# Patient Record
Sex: FEMALE | Race: WHITE | Employment: FULL TIME | URBAN - METROPOLITAN AREA
[De-identification: names, ages, dates, MRNs, and addresses within clinical notes are randomized per-mention and may not be internally consistent; named-entity substitution may affect disease eponyms.]

---

## 2017-01-24 RX ORDER — FLUCONAZOLE 150 MG/1
150 TABLET ORAL DAILY
Qty: 1 TAB | Refills: 1 | Status: SHIPPED | OUTPATIENT
Start: 2017-01-24 | End: 2018-09-19 | Stop reason: SDUPTHER

## 2018-09-19 ENCOUNTER — OFFICE VISIT (OUTPATIENT)
Dept: FAMILY MEDICINE CLINIC | Age: 33
End: 2018-09-19

## 2018-09-19 VITALS
RESPIRATION RATE: 18 BRPM | TEMPERATURE: 98.6 F | WEIGHT: 184 LBS | HEIGHT: 71 IN | HEART RATE: 74 BPM | SYSTOLIC BLOOD PRESSURE: 133 MMHG | BODY MASS INDEX: 25.76 KG/M2 | OXYGEN SATURATION: 98 % | DIASTOLIC BLOOD PRESSURE: 74 MMHG

## 2018-09-19 DIAGNOSIS — B37.9 YEAST INFECTION: ICD-10-CM

## 2018-09-19 DIAGNOSIS — G43.809 OTHER MIGRAINE WITHOUT STATUS MIGRAINOSUS, NOT INTRACTABLE: Primary | ICD-10-CM

## 2018-09-19 RX ORDER — BUTALBITAL, ACETAMINOPHEN AND CAFFEINE 50; 325; 40 MG/1; MG/1; MG/1
TABLET ORAL
Qty: 20 TAB | Refills: 5 | Status: SHIPPED | OUTPATIENT
Start: 2018-09-19 | End: 2018-09-19 | Stop reason: SDUPTHER

## 2018-09-19 RX ORDER — BUTALBITAL, ACETAMINOPHEN AND CAFFEINE 50; 325; 40 MG/1; MG/1; MG/1
TABLET ORAL
Qty: 20 TAB | Refills: 5 | Status: SHIPPED | OUTPATIENT
Start: 2018-09-19

## 2018-09-19 RX ORDER — SUMATRIPTAN 50 MG/1
50 TABLET, FILM COATED ORAL
Qty: 9 TAB | Refills: 5 | Status: SHIPPED | OUTPATIENT
Start: 2018-09-19 | End: 2018-09-19 | Stop reason: SDUPTHER

## 2018-09-19 RX ORDER — FLUCONAZOLE 150 MG/1
150 TABLET ORAL DAILY
Qty: 1 TAB | Refills: 1 | Status: SHIPPED | OUTPATIENT
Start: 2018-09-19 | End: 2018-09-20

## 2018-09-19 RX ORDER — SUMATRIPTAN 50 MG/1
50 TABLET, FILM COATED ORAL
Qty: 9 TAB | Refills: 5 | Status: SHIPPED | OUTPATIENT
Start: 2018-09-19 | End: 2018-09-19

## 2018-09-19 RX ORDER — FLUCONAZOLE 150 MG/1
150 TABLET ORAL DAILY
Qty: 1 TAB | Refills: 1 | Status: SHIPPED | OUTPATIENT
Start: 2018-09-19 | End: 2018-09-19 | Stop reason: SDUPTHER

## 2018-09-19 NOTE — MR AVS SNAPSHOT
[address] [address] 
[phone] Patient: Catherine Conway MRN: [id number] HOR:[birth year]/64[birth year]0932 Visit Information Date & Time Provider Department Dept. Phone Encounter #  
 9/19/2018 10:00 AM Annabel Chairez MD [address] [facility] [phone] [id number] Upcoming Health Maintenance Date Due DTaP/Tdap/Td series (1 - Tdap) 4/21/2005 PAP AKA CERVICAL CYTOLOGY 4/21/2005 Influenza Age 5 to Adult 9/19/2019* *Topic was postponed. The date shown is not the original due date. Allergies as of 9/19/2018  Review Complete On: 9/19/2018 By: Annabel Chairez MD  
  
 Severity Noted Reaction Type Reactions Flexeril [Cyclobenzaprine] Medium 09/23/2015    Rash Flagyl [Metronidazole] Low 05/22/2015    Rash Takes if last option Current Immunizations  Never Reviewed No immunizations on file. Not reviewed this visit You Were Diagnosed With   
  
 Codes Comments Other migraine without status migrainosus, not intractable    -  Primary ICD-10-CM: X68.295 ICD-9-CM: 346.80 Yeast infection     ICD-10-CM: B37.9 ICD-9-CM: 112.9 Vitals BP Pulse Temp Resp Height(growth percentile) Weight(growth percentile) 133/74 (BP 1 Location: Left arm, BP Patient Position: Sitting) 74 98.6 °F (37 °C) (Oral) 18 5' 11\" (1.803 m) 184 lb (83.5 kg) LMP SpO2 BMI OB Status Smoking Status 08/26/2018 98% 25.66 kg/m2 Having regular periods Never Smoker Vitals History BMI and BSA Data Body Mass Index Body Surface Area  
 25.66 kg/m 2 2.05 m 2 Preferred Pharmacy Pharmacy Name Phone [facility] [address], [place] [address] [phone] Your Updated Medication List  
  
   
This list is accurate as of 9/19/18 10:29 AM.  Always use your most recent med list.  
  
  
  
  
 butalbital-acetaminophen-caffeine -40 mg per tablet Commonly known as:  FIORICET, ESGIC  
TAKE ONE TO TWO TABLETS BY MOUTH EVERY 4 HOURS AS NEEDED FOR HEADACHE AND OR PAIN(MAX TABLET IS 6 PER DAY) fluconazole 150 mg tablet Commonly known as:  DIFLUCAN Take 1 Tab by mouth daily for 1 day. SUMAtriptan 50 mg tablet Commonly known as:  IMITREX Take 1 Tab by mouth once as needed for Migraine for up to 1 dose. Prescriptions Printed Refills  
 fluconazole (DIFLUCAN) 150 mg tablet 1 Sig: Take 1 Tab by mouth daily for 1 day. Class: Print Route: Oral  
 SUMAtriptan (IMITREX) 50 mg tablet 5 Sig: Take 1 Tab by mouth once as needed for Migraine for up to 1 dose. Class: Print Route: Oral  
 butalbital-acetaminophen-caffeine (FIORICET, ESGIC) -40 mg per tablet 5 Sig: TAKE ONE TO TWO TABLETS BY MOUTH EVERY 4 HOURS AS NEEDED FOR HEADACHE AND OR PAIN(MAX TABLET IS 6 PER DAY) Class: Print We Performed the Following CBC WITH AUTOMATED DIFF [66744 CPT(R)] HEMOGLOBIN A1C WITH EAG [95288 CPT(R)] METABOLIC PANEL, COMPREHENSIVE [37563 CPT(R)] TSH 3RD GENERATION [45781 CPT(R)] Introducing [facility]! Dear Mei Bradley: Thank you for requesting a [employer] account. Our records indicate that you already have an active [employer] account. You can access your account anytime at [email or web address] Did you know that you can access your hospital and ER discharge instructions at any time in [employer]? You can also review all of your test results from your hospital stay or ER visit. Additional Information If you have questions, please visit the Frequently Asked Questions section of the [employer] website at [email or web address] Remember, [employer] is NOT to be used for urgent needs. For medical emergencies, dial 911. Now available from your iPhone and Android! Please provide this summary of care documentation to your next provider. Your primary care clinician is listed as Magi Williamson. If you have any questions after today's visit, please call [phone].

## 2018-09-19 NOTE — PROGRESS NOTES
Chief Complaint   Patient presents with    New Patient     Re-establish care    Labs     Pt is     Vaginal Pain     \"burning\"     Pt seen in the office today to reestablish care  Pt stated her OB previously prescribed Imitrex for her migraines, she is requesting a printed Rx as she has no insuransce and will be using the [employer] [facility] website     Pt reports recurrent yeast infection, is concerned  Re: blood sugar due to family history in a grandfather. Subjective: (As above and below)     Chief Complaint   Patient presents with    New Patient     Re-establish care    Labs     Pt is     Vaginal Pain     \"burning\"     she is a 28y.o. year old female who presents for evaluation. Reviewed PmHx, RxHx, FmHx, SocHx, AllgHx and updated in chart. Review of Systems - negative except as listed above    Objective:     Vitals:    09/19/18 0942   BP: 133/74   Pulse: 74   Resp: 18   Temp: 98.6 °F (37 °C)   TempSrc: Oral   SpO2: 98%   Weight: 184 lb (83.5 kg)   Height: 5' 11\" (1.803 m)     Physical Examination: General appearance - alert, well appearing, and in no distress  Mental status - normal mood, behavior, speech, dress, motor activity, and thought processes  Mouth - mucous membranes moist, pharynx normal without lesions  Chest - clear to auscultation, no wheezes, rales or rhonchi, symmetric air entry  Heart - normal rate, regular rhythm, normal S1, S2, no murmurs, rubs, clicks or gallops  Musculoskeletal - no joint tenderness, deformity or swelling  Extremities - peripheral pulses normal, no pedal edema, no clubbing or cyanosis    Assessment/ Plan:   1. Other migraine without status migrainosus, not intractable  -refill medication for as needed use  - SUMAtriptan (IMITREX) 50 mg tablet; Take 1 Tab by mouth once as needed for Migraine for up to 1 dose. Dispense: 9 Tab;  Refill: 5  - butalbital-acetaminophen-caffeine (FIORICET, ESGIC) -40 mg per tablet; TAKE ONE TO TWO TABLETS BY MOUTH EVERY 4 HOURS AS NEEDED FOR HEADACHE AND OR PAIN(MAX TABLET IS 6 PER DAY)  Dispense: 20 Tab; Refill: 5    2. Yeast infection  -treat as written  - METABOLIC PANEL, COMPREHENSIVE  - CBC WITH AUTOMATED DIFF  - HEMOGLOBIN A1C WITH EAG  - TSH 3RD GENERATION  - fluconazole (DIFLUCAN) 150 mg tablet; Take 1 Tab by mouth daily for 1 day. Dispense: 1 Tab; Refill: 1     Follow-up Disposition: As needed  I have discussed the diagnosis with the patient and the intended plan as seen in the above orders. The patient has received an after-visit summary and questions were answered concerning future plans.      Medication Side Effects and Warnings were discussed with patient: yes  Patient Labs were reviewed: yes  Patient Past Records were reviewed:  yes    Javed Perales M.D.

## 2018-09-20 LAB
ALBUMIN SERPL-MCNC: 4.4 G/DL (ref 3.5–5.5)
ALBUMIN/GLOB SERPL: 1.5 {RATIO} (ref 1.2–2.2)
ALP SERPL-CCNC: 81 IU/L (ref 39–117)
ALT SERPL-CCNC: 32 IU/L (ref 0–32)
AST SERPL-CCNC: 27 IU/L (ref 0–40)
BASOPHILS # BLD AUTO: 0 X10E3/UL (ref 0–0.2)
BASOPHILS NFR BLD AUTO: 1 %
BILIRUB SERPL-MCNC: 0.4 MG/DL (ref 0–1.2)
BUN SERPL-MCNC: 11 MG/DL (ref 6–20)
BUN/CREAT SERPL: 13 (ref 9–23)
CALCIUM SERPL-MCNC: 9.7 MG/DL (ref 8.7–10.2)
CHLORIDE SERPL-SCNC: 104 MMOL/L (ref 96–106)
CO2 SERPL-SCNC: 19 MMOL/L (ref 20–29)
CREAT SERPL-MCNC: 0.83 MG/DL (ref 0.57–1)
EOSINOPHIL # BLD AUTO: 0.2 X10E3/UL (ref 0–0.4)
EOSINOPHIL NFR BLD AUTO: 2 %
ERYTHROCYTE [DISTWIDTH] IN BLOOD BY AUTOMATED COUNT: 13.5 % (ref 12.3–15.4)
EST. AVERAGE GLUCOSE BLD GHB EST-MCNC: 105 MG/DL
GLOBULIN SER CALC-MCNC: 2.9 G/DL (ref 1.5–4.5)
GLUCOSE SERPL-MCNC: 87 MG/DL (ref 65–99)
HBA1C MFR BLD: 5.3 % (ref 4.8–5.6)
HCT VFR BLD AUTO: 40.5 % (ref 34–46.6)
HGB BLD-MCNC: 13 G/DL (ref 11.1–15.9)
IMM GRANULOCYTES # BLD: 0 X10E3/UL (ref 0–0.1)
IMM GRANULOCYTES NFR BLD: 0 %
LYMPHOCYTES # BLD AUTO: 2.7 X10E3/UL (ref 0.7–3.1)
LYMPHOCYTES NFR BLD AUTO: 35 %
MCH RBC QN AUTO: 29.2 PG (ref 26.6–33)
MCHC RBC AUTO-ENTMCNC: 32.1 G/DL (ref 31.5–35.7)
MCV RBC AUTO: 91 FL (ref 79–97)
MONOCYTES # BLD AUTO: 0.6 X10E3/UL (ref 0.1–0.9)
MONOCYTES NFR BLD AUTO: 8 %
NEUTROPHILS # BLD AUTO: 4.2 X10E3/UL (ref 1.4–7)
NEUTROPHILS NFR BLD AUTO: 54 %
PLATELET # BLD AUTO: 272 X10E3/UL (ref 150–379)
POTASSIUM SERPL-SCNC: 4 MMOL/L (ref 3.5–5.2)
PROT SERPL-MCNC: 7.3 G/DL (ref 6–8.5)
RBC # BLD AUTO: 4.45 X10E6/UL (ref 3.77–5.28)
SODIUM SERPL-SCNC: 140 MMOL/L (ref 134–144)
TSH SERPL DL<=0.005 MIU/L-ACNC: 0.48 UIU/ML (ref 0.45–4.5)
WBC # BLD AUTO: 7.8 X10E3/UL (ref 3.4–10.8)

## 2022-10-19 NOTE — PROGRESS NOTES
ANNUAL EXAM AGES 18-39    Doyce Setting is a ,  44 y.o. female   Patient's last menstrual period was 10/01/2022. She presents for her annual checkup. She is having no significant problems. Menstrual status:  Her periods are normal in flow. She is using three to ten pads or tampons per day, usually regular with a 26-32 day interval with 3-7 day duration. She does not have dysmenorrhea. She reports no premenstrual symptoms. Contraception:  The current method of family planning is none. Wants to discuss. Sexual history:  She  reports being sexually active and has had partner(s) who are male. She reports using the following method of birth control/protection: None. Medical conditions:  Since her last annual GYN exam about one year ago, she has had the following changes in her health history: none. Pap and Mammogram History:  Her most recent Pap smear was normal, obtained 10/22/2021. The patient has never had a mammogram.    Gyn Flowsheet:  No flowsheet data found.     Breast Cancer History:  neg    Medical History:  Patient Active Problem List   Diagnosis Code    Migraine headache G43.909    Displacement of lumbar intervertebral disc without myelopathy M51.26    Lumbosacral radiculitis M54.17     Past Medical History:   Diagnosis Date    Migraine     UTI (urinary tract infection)      Past Surgical History:   Procedure Laterality Date    HX LUMBAR LAMINECTOMY  2016    Herniated disc L5-S1     OB History    Para Term  AB Living   5 3 3 0 2 3   SAB IAB Ectopic Molar Multiple Live Births   2 0 0 0 0 3      # Outcome Date GA Lbr Efrem/2nd Weight Sex Delivery Anes PTL Lv   5 Term 02/10/20   9 lb 3 oz (4.167 kg) F Vag-Spont   LUIS   4 Term 18   9 lb 8.6 oz (4.325 kg) M Vag-Spont   LUIS   3 Term 11 40w5d 09:00 9 lb 13 oz (4.451 kg) F VAGINAL DELI EPI N LUIS      Name: Lawence Matters   2 SAB 09           1 SAB              Social History     Socioeconomic History Marital status:     Number of children: 3   Tobacco Use    Smoking status: Former     Packs/day: 1.00     Types: Cigarettes     Start date:      Quit date: 2017     Years since quittin.8    Smokeless tobacco: Never   Vaping Use    Vaping Use: Never used   Substance and Sexual Activity    Alcohol use: Yes    Drug use: No    Sexual activity: Yes     Partners: Male     Birth control/protection: None      Family History   Problem Relation Age of Onset    Diabetes Mother     Esophageal Cancer Father     Cancer Maternal Grandmother     Heart Disease Maternal Grandfather     Diabetes Maternal Grandfather      Current Outpatient Medications on File Prior to Visit   Medication Sig Dispense Refill    butalbital-acetaminophen-caffeine (FIORICET, ESGIC) -40 mg per tablet TAKE ONE TO TWO TABLETS BY MOUTH EVERY 4 HOURS AS NEEDED FOR HEADACHE AND OR PAIN(MAX TABLET IS 6 PER DAY) 20 Tab 5    ibuprofen (MOTRIN) 800 mg tablet Motrin 800 mg tablet   Take 1 tablet 3 times a day by oral route. (Patient not taking: Reported on 10/20/2022)       No current facility-administered medications on file prior to visit.      Allergies   Allergen Reactions    Flexeril [Cyclobenzaprine] Rash    Ciprofloxacin Unknown (comments)    Flagyl [Metronidazole] Rash     Takes if last option    Sulfa (Sulfonamide Antibiotics) Rash       Review of Systems:  History obtained from the patient-negative for:  Constitutional: weight loss, fever, night sweats  HEENT: hearing loss, earache, congestion, snoring, sorethroat  CV: chest pain, palpitations, edema  Resp: cough, shortness of breath, wheezing  Breast: breast lumps, nipple discharge, galactorrhea  GI: change in bowel habits, abdominal pain, black or bloody stools  : frequency, dysuria, hematuria, vaginal discharge  MSK: back pain, joint pain, muscle pain  Skin: itching, rash, hives  Neuro: dizziness, headache, confusion, weakness  Psych: anxiety, depression, change in mood  Heme/lymph: bleeding, bruising, pallor    Physical Exam  Visit Vitals  /76   Ht 5' 11\" (1.803 m)   Wt 183 lb 6.4 oz (83.2 kg)   LMP 10/01/2022   BMI 25.58 kg/m²       Constitutional  Appearance: well-nourished, well developed, alert, in no acute distress    HENT  Head and Face: appears normal    Neck  Inspection/Palpation: normal appearance, no masses or tenderness  Lymph Nodes: no lymphadenopathy present  Thyroid: gland size normal, nontender, no nodules or masses present on palpation    Chest  Respiratory Effort: breathing unlabored  Auscultation: normal breath sounds    Cardiovascular  Heart:   Auscultation: regular rate and rhythm without murmur    Breasts  Inspection of Breasts: breasts symmetrical, no skin changes, no discharge present, nipple appearance normal, no skin retraction present  Palpation of Breasts and Axillae: no masses present on palpation, no breast tenderness dense breasts  Axillary Lymph Nodes: no lymphadenopathy present    Gastrointestinal  Abdominal Examination: abdomen non-tender to palpation, normal bowel sounds, no masses present  Liver and spleen: no hepatomegaly present, spleen not palpable  Hernias: no hernias identified    Genitourinary  External Genitalia: normal appearance for age, no discharge present, no tenderness present, no inflammatory lesions present, no masses present, no atrophy present  Vagina: normal vaginal vault without central or paravaginal defects, no discharge present, no inflammatory lesions present, no masses present  Bladder: non-tender to palpation  Urethra: appears normal  Cervix: normal   Uterus: normal size, shape and consistency retroverted  Adnexa: no adnexal tenderness present, no adnexal masses present  Perineum: perineum within normal limits, no evidence of trauma, no rashes or skin lesions present  Anus: anus within normal limits, no hemorrhoids present  Inguinal Lymph Nodes: no lymphadenopathy present    Skin  General Inspection: no rash, no lesions identified    Neurologic/Psychiatric  Mental Status:  Orientation: grossly oriented to person, place and time  Mood and Affect: mood normal, affect appropriate    Labs:  No results found for this or any previous visit (from the past 12 hour(s)). Assessment:    ICD-10-CM ICD-9-CM    1.  Routine gynecological examination  Z01.419 V72.31 PAP IG, APTIMA HPV AND RFX 16/18,45 ([id number])          Plan:  Counseled re: diet, exercise, healthy lifestyle  Rec screening mammo at 39  Check on IUD  Return in about 1 year (around 10/20/2023) for Annual.

## 2022-10-20 ENCOUNTER — OFFICE VISIT (OUTPATIENT)
Dept: OBGYN CLINIC | Age: 37
End: 2022-10-20
Payer: COMMERCIAL

## 2022-10-20 VITALS
SYSTOLIC BLOOD PRESSURE: 126 MMHG | BODY MASS INDEX: 25.68 KG/M2 | WEIGHT: 183.4 LBS | DIASTOLIC BLOOD PRESSURE: 76 MMHG | HEIGHT: 71 IN

## 2022-10-20 DIAGNOSIS — Z01.419 ROUTINE GYNECOLOGICAL EXAMINATION: Primary | ICD-10-CM

## 2022-10-20 PROBLEM — M51.26 DISPLACEMENT OF LUMBAR INTERVERTEBRAL DISC WITHOUT MYELOPATHY: Status: ACTIVE | Noted: 2022-10-20

## 2022-10-20 PROBLEM — M54.17 LUMBOSACRAL RADICULITIS: Status: ACTIVE | Noted: 2022-10-20

## 2022-10-20 PROCEDURE — 99395 PREV VISIT EST AGE 18-39: CPT | Performed by: OBSTETRICS & GYNECOLOGY

## 2022-10-20 RX ORDER — IBUPROFEN 800 MG/1
TABLET ORAL
COMMUNITY

## 2022-10-21 DIAGNOSIS — N76.3 CHRONIC VULVITIS: Primary | ICD-10-CM

## 2022-10-21 RX ORDER — CLOTRIMAZOLE AND BETAMETHASONE DIPROPIONATE 10; .64 MG/G; MG/G
CREAM TOPICAL
Qty: 15 G | Refills: 0 | Status: SHIPPED | OUTPATIENT
Start: 2022-10-21 | End: 2022-10-31

## 2023-01-20 ENCOUNTER — PATIENT MESSAGE (OUTPATIENT)
Dept: OBGYN CLINIC | Age: 38
End: 2023-01-20

## 2023-01-21 RX ORDER — NORETHINDRONE ACETATE AND ETHINYL ESTRADIOL, ETHINYL ESTRADIOL AND FERROUS FUMARATE 1MG-10(24)
1 KIT ORAL DAILY
Qty: 3 DOSE PACK | Refills: 3 | Status: SHIPPED | OUTPATIENT
Start: 2023-01-21 | End: 2023-04-15

## 2023-01-21 NOTE — TELEPHONE ENCOUNTER
From: Sondra Dunbar  To: Monalisa Sheehan MD  Sent: 1/20/2023 4:51 PM EST  Subject: Birth control     Hello! After months of trying to call around to specialty pharmacies trying to figure out how to obtain a mirena with no luck. .. can we just go with a birth control pill? They haven't been the best fit in the past, but I'm willing to try them again. Everything should be documented in my chart from Dr. Ashanti Wallace.    Thanks

## 2024-04-17 NOTE — PROGRESS NOTES
Marlen Dasilva is a 38 y.o. female returns for an annual exam     Chief Complaint   Patient presents with    Annual Exam       Patient's last menstrual period was 04/13/2024 (exact date).  Her periods are heavy in flow and usually regular with a 26-32 day interval with 3-7 day duration.  She does not have dysmenorrhea.  Problems: no problems  Birth Control: none.  Last Pap: normal obtained 10/20/2022.  She does not have a history of CARLEEN 2, 3 or cervical cancer.   With regard to the Gardisil vaccine, she has not received it yet      1. Have you been to the ER, urgent care clinic, or hospitalized since your last visit? No    2. Have you seen or consulted any other health care providers outside of the [facility] since your last visit? No    Examination chaperoned by LAMINE MCKNIGHT CMA.

## 2024-04-18 ENCOUNTER — OFFICE VISIT (OUTPATIENT)
Age: 39
End: 2024-04-18

## 2024-04-18 VITALS — WEIGHT: 182.8 LBS | BODY MASS INDEX: 25.5 KG/M2 | DIASTOLIC BLOOD PRESSURE: 74 MMHG | SYSTOLIC BLOOD PRESSURE: 112 MMHG

## 2024-04-18 DIAGNOSIS — N92.0 MENORRHAGIA WITH REGULAR CYCLE: ICD-10-CM

## 2024-04-18 DIAGNOSIS — Z01.419 WELL WOMAN EXAM WITH ROUTINE GYNECOLOGICAL EXAM: Primary | ICD-10-CM

## 2024-04-18 PROCEDURE — 99395 PREV VISIT EST AGE 18-39: CPT | Performed by: OBSTETRICS & GYNECOLOGY

## 2024-04-18 SDOH — ECONOMIC STABILITY: INCOME INSECURITY: HOW HARD IS IT FOR YOU TO PAY FOR THE VERY BASICS LIKE FOOD, HOUSING, MEDICAL CARE, AND HEATING?: NOT VERY HARD

## 2024-04-18 SDOH — ECONOMIC STABILITY: FOOD INSECURITY: WITHIN THE PAST 12 MONTHS, YOU WORRIED THAT YOUR FOOD WOULD RUN OUT BEFORE YOU GOT MONEY TO BUY MORE.: NEVER TRUE

## 2024-04-18 SDOH — ECONOMIC STABILITY: HOUSING INSECURITY
IN THE LAST 12 MONTHS, WAS THERE A TIME WHEN YOU DID NOT HAVE A STEADY PLACE TO SLEEP OR SLEPT IN A SHELTER (INCLUDING NOW)?: NO

## 2024-04-18 SDOH — ECONOMIC STABILITY: FOOD INSECURITY: WITHIN THE PAST 12 MONTHS, THE FOOD YOU BOUGHT JUST DIDN'T LAST AND YOU DIDN'T HAVE MONEY TO GET MORE.: NEVER TRUE

## 2024-04-18 SDOH — ECONOMIC STABILITY: TRANSPORTATION INSECURITY
IN THE PAST 12 MONTHS, HAS LACK OF TRANSPORTATION KEPT YOU FROM MEETINGS, WORK, OR FROM GETTING THINGS NEEDED FOR DAILY LIVING?: NO

## 2024-04-18 NOTE — PROGRESS NOTES
ANNUAL EXAM AGES 18-39    History:  Marlen Dasilva is a 38 y.o. year old  White (non-) female   Patient's last menstrual period was 2024 (exact date).    She presents for her annual checkup.     Patient's last menstrual period was 2024 (exact date).  Her periods are heavy in flow and usually regular with a 28 day interval with 5 day duration.  She does not have dysmenorrhea.  Problems: no problems  Birth Control: none.  Last Pap: normal obtained 10/20/2022.  She does not have a history of CARLEEN 2, 3 or cervical cancer.   With regard to the Gardisil vaccine, she has not received it yet    Medical History:  Problem List:  Patient Active Problem List    Diagnosis Date Noted    Displacement of lumbar intervertebral disc without myelopathy 10/20/2022    Lumbosacral radiculitis 10/20/2022    Migraine headache 2013     Past Medical History:   Diagnosis Date    Migraine     UTI (urinary tract infection)      Past Surgical History:   Procedure Laterality Date    LUMBAR LAMINECTOMY  2016    Herniated disc L5-S1       OB History    Para Term  AB Living   5 3 3 0 2 3   SAB IAB Ectopic Molar Multiple Live Births   2 0 0 0 0 3      # Outcome Date GA Lbr Rick/2nd Weight Sex Delivery Anes PTL Lv   5 Term 02/10/20   4.167 kg (9 lb 3 oz) F Vag-Spont   CATHY   4 Term 18   4.325 kg (9 lb 8.6 oz) M Vag-Spont   CATHY   3 Term 11 40w5d 09:00 4.451 kg (9 lb 13 oz) F Vag-Spont EPI N CATHY      Name: Hayley Hu SAB 09           1 SAB              Social History     Socioeconomic History    Marital status:      Spouse name: None    Number of children: 3    Years of education: None    Highest education level: None   Tobacco Use    Smoking status: Former     Current packs/day: 0.00     Average packs/day: 1 pack/day for 16.0 years (16.0 ttl pk-yrs)     Types: Cigarettes     Start date: 2001     Quit date: 2017     Years since quittin.2    Smokeless tobacco: Never

## 2025-01-27 ENCOUNTER — TELEPHONE (OUTPATIENT)
Age: 40
End: 2025-01-27

## 2025-01-27 DIAGNOSIS — N91.2 AMENORRHEA: Primary | ICD-10-CM

## 2025-01-27 DIAGNOSIS — R51.9 ACUTE NONINTRACTABLE HEADACHE, UNSPECIFIED HEADACHE TYPE: ICD-10-CM

## 2025-01-27 RX ORDER — BUTALBITAL, ACETAMINOPHEN AND CAFFEINE 50; 325; 40 MG/1; MG/1; MG/1
1 TABLET ORAL EVERY 6 HOURS PRN
Qty: 20 TABLET | Refills: 0 | Status: SHIPPED | OUTPATIENT
Start: 2025-01-27 | End: 2025-02-06

## 2025-01-27 RX ORDER — MEDROXYPROGESTERONE ACETATE 10 MG
10 TABLET ORAL DAILY
Qty: 10 TABLET | Refills: 0 | Status: SHIPPED | OUTPATIENT
Start: 2025-01-27 | End: 2025-02-06

## 2025-01-27 NOTE — TELEPHONE ENCOUNTER
This nurse was not able to leave a voice mail due to voice mail box is not set up.    Patient was sent a my chart message to let her know that MD has sent the prescriptions discussed earlier.

## 2025-01-27 NOTE — TELEPHONE ENCOUNTER
Two patient identifier used        39 year old patient last seen in the office on 4/18/2024 and has next ae on 4/22/2025 for ae and mammogram.    Patient calling to say that she is 10 days late for the start of her cycle and she is never late or has skipped a period.    Patient took upt test last week and this am and both were negative for pregnancy.    Patient reports having  a headache for the past 10 days that is relentless and tylenol ,ibuprofen do not help.        Patient states she does not have a PcP    Patient is wondering how to proceed    Please advise    Thank you

## 2025-01-27 NOTE — TELEPHONE ENCOUNTER
Cruzito Bob II, MD   to Me       1/27/25 12:04 PM  I can give her a pill to take for 10 days to start her cycle does she want that?  Also we can send in a few fioricette for her      Patient was advised of MD recommendations and would like to have the medications sent in .  She wants to discuss with  prior to starting the pills and may call back for office visit.    Please send medication to confirmed pharmacy    Thank you

## 2025-01-29 NOTE — TELEPHONE ENCOUNTER
Two patient identifiers used        39  year old patient last seen in the office on 4/18/2204 and has next ae on 4/22/2025.    Patient has not taken the provera and does not want to take it till she has a reason for what is going on.    Patient continues to have a headache and not period.    Patient is wanting to have labs done to check her hormones .    Please advise if this is ok and what to order         Patient is setting up appointment with PCP Monday ?       Thank you

## 2025-01-29 NOTE — TELEPHONE ENCOUNTER
Cruzito Bob II, MD   to Me       1/29/25 12:38 PM  Its unlikely that lab testing her hormones will reveal the reason for this acute problem but we can check an FSH LH estradiol and TSH.  This is usually due to an ovulation problem for the current month and the pill is designed to reset the cycle.  I'm not sure what her headache is about but doubt a definite connection the the current period issue.      Patient advised of Md recommendations and was placed on the schedule to be seen tomorrow at 10:00 am lab orders placed for FSH LH estradiol and TSH as per MD verbal order.    Patient verbalized understanding.

## 2025-02-03 ENCOUNTER — OFFICE VISIT (OUTPATIENT)
Facility: CLINIC | Age: 40
End: 2025-02-03

## 2025-02-03 VITALS
RESPIRATION RATE: 16 BRPM | DIASTOLIC BLOOD PRESSURE: 70 MMHG | BODY MASS INDEX: 27.16 KG/M2 | OXYGEN SATURATION: 100 % | SYSTOLIC BLOOD PRESSURE: 106 MMHG | WEIGHT: 194 LBS | TEMPERATURE: 98 F | HEIGHT: 71 IN | HEART RATE: 70 BPM

## 2025-02-03 DIAGNOSIS — R63.5 WEIGHT GAIN: ICD-10-CM

## 2025-02-03 DIAGNOSIS — E78.49 OTHER HYPERLIPIDEMIA: ICD-10-CM

## 2025-02-03 DIAGNOSIS — G44.201 ACUTE INTRACTABLE TENSION-TYPE HEADACHE: Primary | ICD-10-CM

## 2025-02-03 PROCEDURE — 99214 OFFICE O/P EST MOD 30 MIN: CPT | Performed by: NURSE PRACTITIONER

## 2025-02-03 RX ORDER — SUMATRIPTAN 50 MG/1
TABLET, FILM COATED ORAL
Qty: 9 TABLET | Refills: 2 | Status: SHIPPED | OUTPATIENT
Start: 2025-02-03

## 2025-02-03 ASSESSMENT — PATIENT HEALTH QUESTIONNAIRE - PHQ9
1. LITTLE INTEREST OR PLEASURE IN DOING THINGS: NOT AT ALL
SUM OF ALL RESPONSES TO PHQ QUESTIONS 1-9: 0
SUM OF ALL RESPONSES TO PHQ9 QUESTIONS 1 & 2: 0
2. FEELING DOWN, DEPRESSED OR HOPELESS: NOT AT ALL

## 2025-02-03 ASSESSMENT — ENCOUNTER SYMPTOMS
NAUSEA: 0
VOMITING: 0
PHOTOPHOBIA: 0

## 2025-02-03 NOTE — PROGRESS NOTES
History of present illness:Marlen Dasilva is a 39 y.o. female presenting for weight concerns    Labs/weight:  Mrs. Dasilva reports concerns for weight gain.  She had labs completed at a weight loss clinic. Concerned for abnormal cholesterol.     Headaches:  Headache started 18 days ago. It is constant, dull ache.   She wakes up with a headache, located mostly on the left side of her forehead. Denies aura, nausea, vomiting, dizziness and photophobia.    Her headaches do not wake her at night. She feels she is adequately hydrating.   Reports having headaches with aura years ago, had a normal MRI.  She has 3 children and cares for her 's grandmother.  Her  works 2 hours away.      Review of Systems   Constitutional:  Negative for fever.   Eyes:  Negative for photophobia.   Gastrointestinal:  Negative for nausea and vomiting.   Neurological:  Positive for headaches. Negative for dizziness and numbness.           Past Medical History:   Diagnosis Date    Migraine     UTI (urinary tract infection)      Past Surgical History:   Procedure Laterality Date    LUMBAR LAMINECTOMY  11/2016    Herniated disc L5-S1     Family History   Problem Relation Age of Onset    Diabetes Maternal Grandfather     Heart Disease Maternal Grandfather     Cancer Maternal Grandmother     Diabetes Mother     Cancer Father         Throat cancer    High Blood Pressure Father     High Cholesterol Father     Psoriasis Father     Stroke Father        Prior to Admission medications    Medication Sig Start Date End Date Taking? Authorizing Provider   SUMAtriptan (IMITREX) 50 MG tablet Take 1 tab at onset of migraine. If no relief after 2 hours, may repeat dose once. Max daily dose: 2 tabs 2/3/25  Yes Laura Alberto APRN - CNP   butalbital-acetaminophen-caffeine (FIORICET, ESGIC) -40 MG per tablet Take 1 tablet by mouth every 6 hours as needed for Headaches 1/27/25 2/6/25 Yes Cruzito Bob II, MD   butalbital-acetaminophen-caffeine

## 2025-02-03 NOTE — PROGRESS NOTES
\"Have you been to the ER, urgent care clinic since your last visit?  Hospitalized since your last visit?\"    no    “Have you seen or consulted any other health care providers outside our system since your last visit?”    no    Have you had a mammogram?”   Per patient Schedule in 04/2025    No breast cancer screening on file

## 2025-04-23 ENCOUNTER — RESULTS FOLLOW-UP (OUTPATIENT)
Age: 40
End: 2025-04-23